# Patient Record
Sex: FEMALE
[De-identification: names, ages, dates, MRNs, and addresses within clinical notes are randomized per-mention and may not be internally consistent; named-entity substitution may affect disease eponyms.]

---

## 2022-11-29 ENCOUNTER — NURSE TRIAGE (OUTPATIENT)
Dept: OTHER | Facility: CLINIC | Age: 52
End: 2022-11-29

## 2022-11-30 NOTE — TELEPHONE ENCOUNTER
Location of patient: [place]    Subjective: Caller states \"Medication Question\"     Current Symptoms: Can I take Dramamine and Toprol together? Looking the meds up - there are no contraindications to taking them together. Reason for Disposition   Medication questions   Caller has medicine question only, adult not sick, AND triager answers question    Protocols used:  Information Only Call - No Triage-ADULT-, Medication Question Call-ADULT-